# Patient Record
Sex: FEMALE | Race: BLACK OR AFRICAN AMERICAN | Employment: FULL TIME | URBAN - METROPOLITAN AREA
[De-identification: names, ages, dates, MRNs, and addresses within clinical notes are randomized per-mention and may not be internally consistent; named-entity substitution may affect disease eponyms.]

---

## 2023-02-03 PROBLEM — Z01.419 WOMEN'S ANNUAL ROUTINE GYNECOLOGICAL EXAMINATION: Status: RESOLVED | Noted: 2023-01-04 | Resolved: 2023-02-03

## 2024-10-04 ENCOUNTER — OFFICE VISIT (OUTPATIENT)
Dept: OBGYN CLINIC | Age: 34
End: 2024-10-04

## 2024-10-04 VITALS
BODY MASS INDEX: 26.92 KG/M2 | WEIGHT: 188 LBS | DIASTOLIC BLOOD PRESSURE: 72 MMHG | HEIGHT: 70 IN | SYSTOLIC BLOOD PRESSURE: 118 MMHG

## 2024-10-04 DIAGNOSIS — Z01.419 WOMEN'S ANNUAL ROUTINE GYNECOLOGICAL EXAMINATION: Primary | ICD-10-CM

## 2024-10-04 NOTE — PROGRESS NOTES
I have reviewed the patient's visit today including history, exam and assessment by ETELVINA Gutierrez.  I agree with treatment/plan as above.    Howard Harden MD  10:29 AM  10/04/24

## 2024-10-04 NOTE — PROGRESS NOTES
Sophia Guerrier is a 34 y.o.  who is here today for AE. Doing well. Pt has completed 3 IUI cycles with PREG. Starting birth control next week to prepare for IVF cycle        Patient's last menstrual period was 2024 (exact date).      Date of last Cervical Cancer screen (HPV or PAP): 2023 neg/ HPV neg    Hx abnormal pap or STD:denies    No breast cancer screening on file- never    Fam Hx of Breast, ovarian or uterine cancer:denies    Sexually Active:yes, female partner,             ROS:    Breast: Denies pain, lump or nipple discharge    GYN: Denies pelvic pain, discharge, itching, odor or dysuria.     Constitutional: Negative for chills and fever.     HENT: Negative for severe headaches or vision changes    Respiratory: Negative for cough and shortness of breath.      Cardiovascular: Negative for chest pain and palpitations.     Gastrointestinal: Negative for nausea and vomiting. Negative for diarrhea and constipation    Genitourinary: Negative for dysuria and hematuria.           History reviewed. No pertinent past medical history.    Past Surgical History:   Procedure Laterality Date    ACHILLES TENDON SURGERY      WISDOM TOOTH EXTRACTION         Family History   Problem Relation Age of Onset    No Known Problems Father     No Known Problems Mother     Colon Cancer Neg Hx     Breast Cancer Neg Hx     Ovarian Cancer Neg Hx     Uterine Cancer Neg Hx        Social History     Socioeconomic History    Marital status: Single     Spouse name: Not on file    Number of children: Not on file    Years of education: Not on file    Highest education level: Not on file   Occupational History    Not on file   Tobacco Use    Smoking status: Never    Smokeless tobacco: Never   Substance and Sexual Activity    Alcohol use: Yes     Alcohol/week: 2.0 standard drinks of alcohol     Types: 2 Glasses of wine per week    Drug use: Never    Sexual activity: Yes     Partners: Female     Birth control/protection:

## 2024-10-04 NOTE — PROGRESS NOTES
Patient comes in today for annual exam. No complaints/concerns today.     LAST PAP:  01/04/2023, Negative HPV Negative    LAST MAMMO:  Never    LMP:  Patient's last menstrual period was 09/11/2024 (exact date).    BIRTH CONTROL:  none, female partners    TOBACCO USE:  No    FAMILY HISTORY OF:   Breast Cancer:  No   Ovarian Cancer:  No   Uterine Cancer:  No   Colon Cancer:  No    Vitals:    10/04/24 0922   BP: 118/72   Site: Right Upper Arm   Position: Sitting   Weight: 85.3 kg (188 lb)   Height: 1.778 m (5' 10\")        Deanne Bah MA  10/04/24  9:28 AM

## 2024-11-03 PROBLEM — Z01.419 WOMEN'S ANNUAL ROUTINE GYNECOLOGICAL EXAMINATION: Status: RESOLVED | Noted: 2024-10-04 | Resolved: 2024-11-03
